# Patient Record
Sex: FEMALE | Race: WHITE | NOT HISPANIC OR LATINO | Employment: PART TIME | URBAN - METROPOLITAN AREA
[De-identification: names, ages, dates, MRNs, and addresses within clinical notes are randomized per-mention and may not be internally consistent; named-entity substitution may affect disease eponyms.]

---

## 2023-03-29 ENCOUNTER — OFFICE VISIT (OUTPATIENT)
Dept: PRIMARY CARE | Facility: CLINIC | Age: 43
End: 2023-03-29
Payer: COMMERCIAL

## 2023-03-29 VITALS
TEMPERATURE: 98.4 F | DIASTOLIC BLOOD PRESSURE: 82 MMHG | OXYGEN SATURATION: 93 % | SYSTOLIC BLOOD PRESSURE: 148 MMHG | HEART RATE: 117 BPM | WEIGHT: 206.4 LBS | BODY MASS INDEX: 37.75 KG/M2

## 2023-03-29 DIAGNOSIS — S16.1XXA NECK STRAIN, INITIAL ENCOUNTER: Primary | ICD-10-CM

## 2023-03-29 PROBLEM — N92.6 MENSES, IRREGULAR: Status: ACTIVE | Noted: 2023-03-29

## 2023-03-29 PROBLEM — E55.9 VITAMIN D INSUFFICIENCY: Status: ACTIVE | Noted: 2023-03-29

## 2023-03-29 PROBLEM — N92.6 MISSED PERIOD: Status: ACTIVE | Noted: 2023-03-29

## 2023-03-29 PROBLEM — J30.2 SEASONAL ALLERGIES: Status: ACTIVE | Noted: 2023-03-29

## 2023-03-29 PROCEDURE — 99212 OFFICE O/P EST SF 10 MIN: CPT | Performed by: NURSE PRACTITIONER

## 2023-03-29 PROCEDURE — 1036F TOBACCO NON-USER: CPT | Performed by: NURSE PRACTITIONER

## 2023-03-29 RX ORDER — ERGOCALCIFEROL 1.25 MG/1
1 CAPSULE ORAL
COMMUNITY
Start: 2022-08-25

## 2023-03-29 RX ORDER — NORETHINDRONE ACETATE AND ETHINYL ESTRADIOL 1MG-20(21)
1 KIT ORAL DAILY
COMMUNITY
Start: 2022-11-03 | End: 2023-11-09 | Stop reason: SINTOL

## 2023-03-29 ASSESSMENT — PAIN SCALES - GENERAL: PAINLEVEL: 4

## 2023-03-29 NOTE — PROGRESS NOTES
Subjective   Patient ID: Marisabel Stone is a 42 y.o. female who presents for Neck Pain (For the past 3 weeks).    HPI Pleasant established patient here with reports of neck pain, started 2 weeks ago when she had a cold with sinus congestion. Cold symptoms have resolved but continues with discomfort when she turns her head to the left. Denies injury/trauma or neck stiffness. Able to perform chin tuck, has FROM.     Review of Systems  HPI    Objective   /82 (BP Location: Left arm, Patient Position: Sitting, BP Cuff Size: Large adult)   Pulse (!) 117   Temp 36.9 °C (98.4 °F)   Wt 93.6 kg (206 lb 6.4 oz)   LMP 02/24/2023 (Exact Date)   SpO2 93%   BMI 37.75 kg/m²     Physical Exam  Constitutional:       Appearance: Normal appearance.   Neck:      Comments: Slight with turn to left  Cardiovascular:      Rate and Rhythm: Normal rate and regular rhythm.   Pulmonary:      Effort: Pulmonary effort is normal.      Breath sounds: Normal breath sounds.   Abdominal:      General: Bowel sounds are normal.      Palpations: Abdomen is soft.   Musculoskeletal:      Cervical back: Normal range of motion and neck supple. Tenderness present. No edema, signs of trauma, rigidity, torticollis or crepitus. Muscular tenderness present. Normal range of motion.   Neurological:      General: No focal deficit present.      Mental Status: She is alert and oriented to person, place, and time.   Psychiatric:         Mood and Affect: Mood normal.           Assessment/Plan   Problem List Items Addressed This Visit          Musculoskeletal    Neck strain, initial encounter - Primary     Stretches  Ibuprofen prn

## 2023-03-29 NOTE — PROGRESS NOTES
Subjective   Patient ID: Marisabel Stone is a 42 y.o. female who presents for Neck Pain (For the past 3 weeks).    HPI     Review of Systems    Objective   /82 (BP Location: Left arm, Patient Position: Sitting, BP Cuff Size: Large adult)   Pulse (!) 117   Temp 36.9 °C (98.4 °F)   Wt 93.6 kg (206 lb 6.4 oz)   LMP 02/24/2023 (Exact Date)   SpO2 93%   BMI 37.75 kg/m²     Physical Exam    Assessment/Plan

## 2023-08-25 ASSESSMENT — PROMIS GLOBAL HEALTH SCALE
RATE_GENERAL_HEALTH: GOOD
RATE_SOCIAL_SATISFACTION: VERY GOOD
RATE_QUALITY_OF_LIFE: VERY GOOD
RATE_AVERAGE_FATIGUE: MILD
CARRYOUT_SOCIAL_ACTIVITIES: VERY GOOD
RATE_PHYSICAL_HEALTH: GOOD
RATE_AVERAGE_PAIN: 3
CARRYOUT_PHYSICAL_ACTIVITIES: COMPLETELY
RATE_MENTAL_HEALTH: VERY GOOD
EMOTIONAL_PROBLEMS: RARELY

## 2023-08-29 ENCOUNTER — OFFICE VISIT (OUTPATIENT)
Dept: PRIMARY CARE | Facility: CLINIC | Age: 43
End: 2023-08-29
Payer: COMMERCIAL

## 2023-08-29 VITALS
BODY MASS INDEX: 37.87 KG/M2 | TEMPERATURE: 97.8 F | DIASTOLIC BLOOD PRESSURE: 80 MMHG | SYSTOLIC BLOOD PRESSURE: 122 MMHG | HEART RATE: 86 BPM | OXYGEN SATURATION: 98 % | HEIGHT: 61 IN | WEIGHT: 200.6 LBS

## 2023-08-29 DIAGNOSIS — Z12.31 BREAST CANCER SCREENING BY MAMMOGRAM: ICD-10-CM

## 2023-08-29 DIAGNOSIS — E55.9 VITAMIN D INSUFFICIENCY: ICD-10-CM

## 2023-08-29 DIAGNOSIS — Z00.00 ANNUAL PHYSICAL EXAM: Primary | ICD-10-CM

## 2023-08-29 DIAGNOSIS — J30.2 SEASONAL ALLERGIES: ICD-10-CM

## 2023-08-29 LAB
CALCIDIOL (25 OH VITAMIN D3) (NG/ML) IN SER/PLAS: 30 NG/ML
ERYTHROCYTE DISTRIBUTION WIDTH (RATIO) BY AUTOMATED COUNT: 13.2 % (ref 11.5–14.5)
ERYTHROCYTE MEAN CORPUSCULAR HEMOGLOBIN CONCENTRATION (G/DL) BY AUTOMATED: 31.8 G/DL (ref 32–36)
ERYTHROCYTE MEAN CORPUSCULAR VOLUME (FL) BY AUTOMATED COUNT: 91 FL (ref 80–100)
ERYTHROCYTES (10*6/UL) IN BLOOD BY AUTOMATED COUNT: 4.95 X10E12/L (ref 4–5.2)
HEMATOCRIT (%) IN BLOOD BY AUTOMATED COUNT: 44.9 % (ref 36–46)
HEMOGLOBIN (G/DL) IN BLOOD: 14.3 G/DL (ref 12–16)
LEUKOCYTES (10*3/UL) IN BLOOD BY AUTOMATED COUNT: 6.7 X10E9/L (ref 4.4–11.3)
NRBC (PER 100 WBCS) BY AUTOMATED COUNT: 0 /100 WBC (ref 0–0)
PLATELETS (10*3/UL) IN BLOOD AUTOMATED COUNT: 358 X10E9/L (ref 150–450)
THYROTROPIN (MIU/L) IN SER/PLAS BY DETECTION LIMIT <= 0.05 MIU/L: 1.97 MIU/L (ref 0.44–3.98)

## 2023-08-29 PROCEDURE — 83036 HEMOGLOBIN GLYCOSYLATED A1C: CPT

## 2023-08-29 PROCEDURE — 99396 PREV VISIT EST AGE 40-64: CPT | Performed by: NURSE PRACTITIONER

## 2023-08-29 PROCEDURE — 85027 COMPLETE CBC AUTOMATED: CPT

## 2023-08-29 PROCEDURE — 84443 ASSAY THYROID STIM HORMONE: CPT

## 2023-08-29 PROCEDURE — 1036F TOBACCO NON-USER: CPT | Performed by: NURSE PRACTITIONER

## 2023-08-29 PROCEDURE — 80061 LIPID PANEL: CPT

## 2023-08-29 PROCEDURE — 82306 VITAMIN D 25 HYDROXY: CPT

## 2023-08-29 PROCEDURE — 80053 COMPREHEN METABOLIC PANEL: CPT

## 2023-08-29 ASSESSMENT — PAIN SCALES - GENERAL: PAINLEVEL: 0-NO PAIN

## 2023-08-29 NOTE — PROGRESS NOTES
"Subjective   Patient ID: Marisabel Stone is a 42 y.o. female who presents for Annual Exam.    HPI  Pleasant established patient here for Annual exam.  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    HENT: Negative.     Respiratory: Negative.     Cardiovascular: Negative.    Gastrointestinal: Negative.    Genitourinary: Negative.    Musculoskeletal: Negative.    Psychiatric/Behavioral: Negative.     All other systems reviewed and are negative.    .vsVisit Vitals  /80 (BP Location: Left arm, Patient Position: Sitting, BP Cuff Size: Large adult)   Pulse 86   Temp 36.6 °C (97.8 °F)   Ht 1.537 m (5' 0.5\")   Wt 91 kg (200 lb 9.6 oz)   LMP 08/11/2023   SpO2 98%   BMI 38.53 kg/m²   OB Status Having periods   Smoking Status Never   BSA 1.97 m²         Objective   Physical Exam  Physical Exam  Vitals reviewed.   Constitutional:       General: She is active.   HENT:      Head: Normocephalic and atraumatic.   Eyes:      Extraocular Movements: Extraocular movements intact.      Pupils: Pupils are equal, round, and reactive to light.   Cardiovascular:      Rate and Rhythm: Normal rate and regular rhythm.   Pulmonary:      Effort: Pulmonary effort is normal.      Breath sounds: Normal breath sounds.   Abdominal:      General: Bowel sounds are normal.   Musculoskeletal:         General: Normal range of motion.      Cervical back: Neck supple.   Skin:     General: Skin is warm and dry.      Capillary Refill: Capillary refill takes less than 2 seconds.   Neurological:      General: No focal deficit " present.      Mental Status: She is alert.     Assessment/Plan   Problem List Items Addressed This Visit       Seasonal allergies     Well controlled  Zyrtec prn         Vitamin D insufficiency    Relevant Orders    Vitamin D, Total    Annual physical exam - Primary    Relevant Orders    CBC    Comprehensive Metabolic Panel    Vitamin D, Total    TSH with reflex to Free T4 if abnormal    Hemoglobin A1C    Lipid Panel    BI mammo bilateral screening tomosynthesis    Breast cancer screening by mammogram    Relevant Orders    BI mammo bilateral screening tomosynthesis

## 2023-08-29 NOTE — PROGRESS NOTES
Subjective   Patient ID: Marisabel Stone is a 42 y.o. female who presents for Annual Exam.    HPI     Review of Systems    Objective   LMP 08/11/2023     Physical Exam    Assessment/Plan

## 2023-08-30 LAB
ALANINE AMINOTRANSFERASE (SGPT) (U/L) IN SER/PLAS: 14 U/L (ref 7–45)
ALBUMIN (G/DL) IN SER/PLAS: 4.4 G/DL (ref 3.4–5)
ALKALINE PHOSPHATASE (U/L) IN SER/PLAS: 72 U/L (ref 33–110)
ANION GAP IN SER/PLAS: 15 MMOL/L (ref 10–20)
ASPARTATE AMINOTRANSFERASE (SGOT) (U/L) IN SER/PLAS: 26 U/L (ref 9–39)
BILIRUBIN TOTAL (MG/DL) IN SER/PLAS: 0.3 MG/DL (ref 0–1.2)
CALCIUM (MG/DL) IN SER/PLAS: 9.1 MG/DL (ref 8.6–10.6)
CARBON DIOXIDE, TOTAL (MMOL/L) IN SER/PLAS: 23 MMOL/L (ref 21–32)
CHLORIDE (MMOL/L) IN SER/PLAS: 106 MMOL/L (ref 98–107)
CHOLESTEROL (MG/DL) IN SER/PLAS: 199 MG/DL (ref 0–199)
CHOLESTEROL IN HDL (MG/DL) IN SER/PLAS: 40.1 MG/DL
CHOLESTEROL/HDL RATIO: 5
CREATININE (MG/DL) IN SER/PLAS: 0.67 MG/DL (ref 0.5–1.05)
ESTIMATED AVERAGE GLUCOSE FOR HBA1C: 108 MG/DL
GFR FEMALE: >90 ML/MIN/1.73M2
GLUCOSE (MG/DL) IN SER/PLAS: 96 MG/DL (ref 74–99)
HEMOGLOBIN A1C/HEMOGLOBIN TOTAL IN BLOOD: 5.4 %
LDL: 120 MG/DL (ref 0–99)
POTASSIUM (MMOL/L) IN SER/PLAS: 3.9 MMOL/L (ref 3.5–5.3)
PROTEIN TOTAL: 7.3 G/DL (ref 6.4–8.2)
SODIUM (MMOL/L) IN SER/PLAS: 140 MMOL/L (ref 136–145)
TRIGLYCERIDE (MG/DL) IN SER/PLAS: 194 MG/DL (ref 0–149)
UREA NITROGEN (MG/DL) IN SER/PLAS: 7 MG/DL (ref 6–23)
VLDL: 39 MG/DL (ref 0–40)

## 2023-11-08 PROBLEM — E66.9 CLASS 2 OBESITY WITH BODY MASS INDEX (BMI) OF 37.0 TO 37.9 IN ADULT: Status: ACTIVE | Noted: 2023-11-08

## 2023-11-08 PROBLEM — E66.812 CLASS 2 OBESITY WITH BODY MASS INDEX (BMI) OF 37.0 TO 37.9 IN ADULT: Status: ACTIVE | Noted: 2023-11-08

## 2023-11-09 ENCOUNTER — OFFICE VISIT (OUTPATIENT)
Dept: OBSTETRICS AND GYNECOLOGY | Facility: CLINIC | Age: 43
End: 2023-11-09
Payer: COMMERCIAL

## 2023-11-09 VITALS
BODY MASS INDEX: 37.31 KG/M2 | HEIGHT: 61 IN | WEIGHT: 197.6 LBS | SYSTOLIC BLOOD PRESSURE: 144 MMHG | DIASTOLIC BLOOD PRESSURE: 86 MMHG

## 2023-11-09 DIAGNOSIS — Z30.41 ENCOUNTER FOR SURVEILLANCE OF CONTRACEPTIVE PILLS: ICD-10-CM

## 2023-11-09 DIAGNOSIS — Z01.419 WELL WOMAN EXAM WITH ROUTINE GYNECOLOGICAL EXAM: Primary | ICD-10-CM

## 2023-11-09 PROCEDURE — 99396 PREV VISIT EST AGE 40-64: CPT

## 2023-11-09 PROCEDURE — 1036F TOBACCO NON-USER: CPT

## 2023-11-09 RX ORDER — NORETHINDRONE 0.35 MG/1
1 TABLET ORAL DAILY
Qty: 84 TABLET | Refills: 3 | Status: SHIPPED | OUTPATIENT
Start: 2023-11-09 | End: 2024-11-08

## 2023-11-09 NOTE — PROGRESS NOTES
"Assessment/Plan     42 y.o. presents for well woman exam.     Cervical Cancer Screening  - PAP up to date  - Reviewed ASCCP guidelines with pt; next PAP 11/2027    STD Screening  - declines    Contraceptive Plan  - Pt currently taking Junel OCP, pt happy with this method of contraception  - Reviewed with patient she has risk factors including elevated BP (144/82 today) as well as obesity, which increase the risk of blood clots while taking birth control containing estrogen. Recommended switching to POP, to which she is agreeable.  - POP sent to pt preferred pharmacy. Instructions provided, all questions answered.    Health Maintenance  - SBE reviewed and encouraged monthly  - Mammo q1-2 years if 40+; order already in EMR, pt instructed on how to schedule   - diet and exercise reviewed; recommended 150min exercise per week or 30min/day x5 days  - Routine follow up with PCP for health maintenance examination encouraged    F/U in 1 year or as needed.    FABRICIO Jeter-MADISON     Subjective     Marisabel Stone is a 42 y.o. female presenting for a well woman exam. She states her periods have been getting lighter over the past year since she has been on OCP's.     PMH, PSH, allergies, and current medications reviewed - see chart.  Family Hx reviewed. Pt reports no family hx of gynecologic or breast cancer in first degree family members.   OB Hx: G0    Social Hx:  - relationship: single  - sexually active: yes  - employment: works for [employer]  - diet:  general  - exercise: walks daily  - denies tobacco or illicit drug use. reports occasional alcohol use.     Sexual Concerns: denies   PAP Hx: Last PAP 11/3/22 nml, HPV neg  Last mammo: 1/5/23 Cat 1 neg  STI Hx: denies  Contraception: Junel OCP  Menstrual Periods: LMP 11/5/23. Periods are regular every 28-30 days, lasting  3-5  days.    Objective     /86   Ht 1.549 m (5' 1\")   Wt 89.6 kg (197 lb 9.6 oz)   LMP 11/05/2023 (Exact Date)   BMI 37.34 kg/m² "     Physical Exam  Vitals reviewed.   Constitutional:       General: She is not in acute distress.     Appearance: Normal appearance.   HENT:      Head: Normocephalic and atraumatic.      Mouth/Throat:      Palate: No mass.   Neck:      Thyroid: No thyroid mass, thyromegaly or thyroid tenderness.   Cardiovascular:      Rate and Rhythm: Normal rate and regular rhythm.      Heart sounds: Normal heart sounds, S1 normal and S2 normal.   Pulmonary:      Effort: Pulmonary effort is normal.      Breath sounds: Normal breath sounds.   Chest:   Breasts:     Right: Normal. No mass, nipple discharge, skin change or tenderness.      Left: Normal. No mass, nipple discharge, skin change or tenderness.   Abdominal:      General: Abdomen is flat.      Palpations: Abdomen is soft.      Tenderness: There is no abdominal tenderness.   Genitourinary:     General: Normal vulva.      Exam position: Lithotomy position.      Pubic Area: No rash.       Labia:         Right: No rash or lesion.         Left: No rash or lesion.       Urethra: No prolapse, urethral swelling or urethral lesion.      Vagina: Normal.      Cervix: No cervical motion tenderness, discharge or lesion.      Uterus: Not enlarged and not tender.       Adnexa:         Right: No mass or tenderness.          Left: No mass or tenderness.     Musculoskeletal:         General: Normal range of motion.      Cervical back: Normal range of motion and neck supple.   Skin:     General: Skin is warm and dry.   Neurological:      Mental Status: She is alert and oriented to person, place, and time.   Psychiatric:         Mood and Affect: Mood normal.         Behavior: Behavior normal.         Thought Content: Thought content normal.         Judgment: Judgment normal.

## 2024-01-11 ENCOUNTER — ANCILLARY PROCEDURE (OUTPATIENT)
Dept: RADIOLOGY | Facility: CLINIC | Age: 44
End: 2024-01-11
Payer: COMMERCIAL

## 2024-01-11 DIAGNOSIS — Z00.00 ANNUAL PHYSICAL EXAM: ICD-10-CM

## 2024-01-11 DIAGNOSIS — Z12.31 BREAST CANCER SCREENING BY MAMMOGRAM: ICD-10-CM

## 2024-01-11 PROCEDURE — 77063 BREAST TOMOSYNTHESIS BI: CPT | Mod: BILATERAL PROCEDURE | Performed by: RADIOLOGY

## 2024-01-11 PROCEDURE — 77067 SCR MAMMO BI INCL CAD: CPT

## 2024-01-11 PROCEDURE — 77067 SCR MAMMO BI INCL CAD: CPT | Mod: BILATERAL PROCEDURE | Performed by: RADIOLOGY

## 2024-09-01 ASSESSMENT — PROMIS GLOBAL HEALTH SCALE
RATE_PHYSICAL_HEALTH: GOOD
RATE_MENTAL_HEALTH: VERY GOOD
RATE_AVERAGE_PAIN: 4
RATE_AVERAGE_FATIGUE: MILD
CARRYOUT_SOCIAL_ACTIVITIES: EXCELLENT
RATE_SOCIAL_SATISFACTION: VERY GOOD
RATE_QUALITY_OF_LIFE: VERY GOOD
CARRYOUT_PHYSICAL_ACTIVITIES: COMPLETELY
RATE_GENERAL_HEALTH: GOOD
EMOTIONAL_PROBLEMS: SOMETIMES

## 2024-09-03 ENCOUNTER — APPOINTMENT (OUTPATIENT)
Dept: PRIMARY CARE | Facility: CLINIC | Age: 44
End: 2024-09-03
Payer: COMMERCIAL

## 2024-09-03 VITALS
WEIGHT: 197.6 LBS | OXYGEN SATURATION: 100 % | SYSTOLIC BLOOD PRESSURE: 124 MMHG | TEMPERATURE: 96.8 F | BODY MASS INDEX: 37.31 KG/M2 | HEART RATE: 77 BPM | DIASTOLIC BLOOD PRESSURE: 82 MMHG | HEIGHT: 61 IN

## 2024-09-03 DIAGNOSIS — Z12.31 BREAST CANCER SCREENING BY MAMMOGRAM: ICD-10-CM

## 2024-09-03 DIAGNOSIS — Z00.00 ANNUAL PHYSICAL EXAM: Primary | ICD-10-CM

## 2024-09-03 DIAGNOSIS — J30.2 SEASONAL ALLERGIES: ICD-10-CM

## 2024-09-03 DIAGNOSIS — E55.9 VITAMIN D INSUFFICIENCY: ICD-10-CM

## 2024-09-03 DIAGNOSIS — E66.09 CLASS 2 OBESITY DUE TO EXCESS CALORIES WITHOUT SERIOUS COMORBIDITY WITH BODY MASS INDEX (BMI) OF 37.0 TO 37.9 IN ADULT: ICD-10-CM

## 2024-09-03 LAB
25(OH)D3 SERPL-MCNC: 29 NG/ML (ref 30–100)
ALBUMIN SERPL BCP-MCNC: 4.4 G/DL (ref 3.4–5)
ALP SERPL-CCNC: 82 U/L (ref 33–110)
ALT SERPL W P-5'-P-CCNC: 14 U/L (ref 7–45)
ANION GAP SERPL CALC-SCNC: 15 MMOL/L (ref 10–20)
AST SERPL W P-5'-P-CCNC: 28 U/L (ref 9–39)
BILIRUB SERPL-MCNC: 0.5 MG/DL (ref 0–1.2)
BUN SERPL-MCNC: 9 MG/DL (ref 6–23)
CALCIUM SERPL-MCNC: 9.2 MG/DL (ref 8.6–10.6)
CHLORIDE SERPL-SCNC: 105 MMOL/L (ref 98–107)
CHOLEST SERPL-MCNC: 205 MG/DL (ref 0–199)
CHOLESTEROL/HDL RATIO: 4.8
CO2 SERPL-SCNC: 23 MMOL/L (ref 21–32)
CREAT SERPL-MCNC: 0.65 MG/DL (ref 0.5–1.05)
EGFRCR SERPLBLD CKD-EPI 2021: >90 ML/MIN/1.73M*2
ERYTHROCYTE [DISTWIDTH] IN BLOOD BY AUTOMATED COUNT: 13.3 % (ref 11.5–14.5)
EST. AVERAGE GLUCOSE BLD GHB EST-MCNC: 114 MG/DL
GLUCOSE SERPL-MCNC: 99 MG/DL (ref 74–99)
HBA1C MFR BLD: 5.6 %
HCT VFR BLD AUTO: 51.6 % (ref 36–46)
HDLC SERPL-MCNC: 42.8 MG/DL
HGB BLD-MCNC: 15.9 G/DL (ref 12–16)
LDLC SERPL CALC-MCNC: 128 MG/DL
MCH RBC QN AUTO: 28.6 PG (ref 26–34)
MCHC RBC AUTO-ENTMCNC: 30.8 G/DL (ref 32–36)
MCV RBC AUTO: 93 FL (ref 80–100)
NON HDL CHOLESTEROL: 162 MG/DL (ref 0–149)
NRBC BLD-RTO: 0 /100 WBCS (ref 0–0)
PLATELET # BLD AUTO: 308 X10*3/UL (ref 150–450)
POTASSIUM SERPL-SCNC: 3.9 MMOL/L (ref 3.5–5.3)
PROT SERPL-MCNC: 7.4 G/DL (ref 6.4–8.2)
RBC # BLD AUTO: 5.56 X10*6/UL (ref 4–5.2)
SODIUM SERPL-SCNC: 139 MMOL/L (ref 136–145)
TRIGL SERPL-MCNC: 171 MG/DL (ref 0–149)
TSH SERPL-ACNC: 2.22 MIU/L (ref 0.44–3.98)
VLDL: 34 MG/DL (ref 0–40)
WBC # BLD AUTO: 7.4 X10*3/UL (ref 4.4–11.3)

## 2024-09-03 PROCEDURE — 84443 ASSAY THYROID STIM HORMONE: CPT

## 2024-09-03 PROCEDURE — 80053 COMPREHEN METABOLIC PANEL: CPT

## 2024-09-03 PROCEDURE — 3008F BODY MASS INDEX DOCD: CPT | Performed by: NURSE PRACTITIONER

## 2024-09-03 PROCEDURE — 85027 COMPLETE CBC AUTOMATED: CPT

## 2024-09-03 PROCEDURE — 99396 PREV VISIT EST AGE 40-64: CPT | Performed by: NURSE PRACTITIONER

## 2024-09-03 PROCEDURE — 83036 HEMOGLOBIN GLYCOSYLATED A1C: CPT

## 2024-09-03 PROCEDURE — 1036F TOBACCO NON-USER: CPT | Performed by: NURSE PRACTITIONER

## 2024-09-03 PROCEDURE — 82306 VITAMIN D 25 HYDROXY: CPT

## 2024-09-03 PROCEDURE — 80061 LIPID PANEL: CPT

## 2024-09-03 ASSESSMENT — PATIENT HEALTH QUESTIONNAIRE - PHQ9
2. FEELING DOWN, DEPRESSED OR HOPELESS: NOT AT ALL
1. LITTLE INTEREST OR PLEASURE IN DOING THINGS: NOT AT ALL
SUM OF ALL RESPONSES TO PHQ9 QUESTIONS 1 AND 2: 0

## 2024-09-03 ASSESSMENT — PAIN SCALES - GENERAL: PAINLEVEL: 0-NO PAIN

## 2024-09-03 ASSESSMENT — ENCOUNTER SYMPTOMS: DEPRESSION: 0

## 2024-09-03 NOTE — PROGRESS NOTES
"Subjective   Patient ID: Marisabel Stone is a 43 y.o. female who presents for Annual Exam (Pt is fasting ).    HPI  Pleasant established 44 y/o female with PMH allergies, Abnormal menses, presents for Annual Exam  Current concerns  Reports muscles stiff in am, does not stretch, recently at [place], slept in cabin    Allergies well controlled  Defers flu, gets vaccine at work    Single  Works as   No kids     Diet healthy, tries to eat a good balance  Caffeine 1-2  Water 48oz+  Exercise walks, bikes    Non-smoker  Alcohol Social       Eye exam 2020  Dental exam 2023, scheduled  Gyn no abnormal pap 2023  scheduled  Mammogram, Jan 2023, referral       Review of Systems  Review of Systems   Constitutional: Negative.    HENT: Negative.     Respiratory: Negative.     Cardiovascular: Negative.    Gastrointestinal: Negative.    Genitourinary: Negative.    Musculoskeletal: Negative.    Psychiatric/Behavioral: Negative.     All other systems reviewed and are negative.    .vsVisit Vitals  /82 (BP Location: Left arm, Patient Position: Sitting)   Pulse 77   Temp 36 °C (96.8 °F)   Ht 1.549 m (5' 1\")   Wt 89.6 kg (197 lb 9.6 oz)   LMP 09/03/2024   SpO2 100%   BMI 37.34 kg/m²   OB Status Having periods   Smoking Status Never   BSA 1.96 m²         Objective   Physical Exam  Vitals reviewed.   Constitutional:       Appearance: Normal appearance.   HENT:      Head: Normocephalic and atraumatic.      Right Ear: Tympanic membrane and ear canal normal.      Left Ear: Tympanic membrane and ear canal normal.      Nose: Nose normal.      Mouth/Throat:      Pharynx: Oropharynx is clear.   Eyes:      Extraocular Movements: Extraocular movements intact.      Conjunctiva/sclera: Conjunctivae normal.      Pupils: Pupils are equal, round, and reactive to light.   Cardiovascular:      Rate and Rhythm: Normal rate and regular rhythm.      Pulses: Normal pulses.      Heart sounds: Normal heart sounds.   Pulmonary:      " Effort: Pulmonary effort is normal.      Breath sounds: Normal breath sounds. No wheezing.   Abdominal:      General: Bowel sounds are normal. There is no distension.      Palpations: Abdomen is soft.      Tenderness: There is no abdominal tenderness.      Comments: hypo   Musculoskeletal:         General: Normal range of motion.      Cervical back: Normal range of motion and neck supple.   Skin:     General: Skin is warm.      Capillary Refill: Capillary refill takes 2 to 3 seconds.   Neurological:      General: No focal deficit present.      Mental Status: She is alert.   Psychiatric:         Mood and Affect: Mood normal.           Assessment/Plan   Problem List Items Addressed This Visit       Seasonal allergies    Vitamin D insufficiency    Relevant Orders    Vitamin D 25-Hydroxy,Total (for eval of Vitamin D levels)    Annual physical exam - Primary    Relevant Orders    CBC    Comprehensive Metabolic Panel    Vitamin D 25-Hydroxy,Total (for eval of Vitamin D levels)    TSH with reflex to Free T4 if abnormal    Hemoglobin A1C    Lipid Panel    BI mammo bilateral screening tomosynthesis    Breast cancer screening by mammogram    Relevant Orders    BI mammo bilateral screening tomosynthesis    Class 2 obesity with body mass index (BMI) of 37.0 to 37.9 in adult

## 2024-09-03 NOTE — PATIENT INSTRUCTIONS
You are due for an Eye exam    Start daily stretches, walking is a great exercise!!  Aim for no less than 15 minutes per day, With a goal of 3 miles a day!    Health Maintenance  Continue to follow a healthy diet with fruits and vegetables at most meals.  Daily exercise is recommended as well as adding weights 3 times a week to maintain muscle mass and for bone health.  It is recommended you drink 6 to 8 glasses of water daily, more when you are exerting yourself or in hot weather.  Make sure you follow the health screening guidelines and keep up to date on your immunizations!

## 2024-09-16 ENCOUNTER — PATIENT MESSAGE (OUTPATIENT)
Dept: OBSTETRICS AND GYNECOLOGY | Facility: CLINIC | Age: 44
End: 2024-09-16
Payer: COMMERCIAL

## 2024-09-16 DIAGNOSIS — Z30.41 ENCOUNTER FOR SURVEILLANCE OF CONTRACEPTIVE PILLS: ICD-10-CM

## 2024-09-16 RX ORDER — NORETHINDRONE 0.35 MG/1
1 TABLET ORAL DAILY
Qty: 28 TABLET | Refills: 2 | Status: SHIPPED | OUTPATIENT
Start: 2024-09-16 | End: 2025-09-16

## 2024-10-22 ENCOUNTER — OFFICE VISIT (OUTPATIENT)
Dept: URGENT CARE | Age: 44
End: 2024-10-22
Payer: COMMERCIAL

## 2024-10-22 VITALS
WEIGHT: 199 LBS | OXYGEN SATURATION: 98 % | HEIGHT: 62 IN | DIASTOLIC BLOOD PRESSURE: 87 MMHG | RESPIRATION RATE: 18 BRPM | HEART RATE: 110 BPM | BODY MASS INDEX: 36.62 KG/M2 | SYSTOLIC BLOOD PRESSURE: 152 MMHG | TEMPERATURE: 98.6 F

## 2024-10-22 DIAGNOSIS — B02.9 HERPES ZOSTER WITHOUT COMPLICATION: ICD-10-CM

## 2024-10-22 DIAGNOSIS — L50.9 URTICARIA: ICD-10-CM

## 2024-10-22 DIAGNOSIS — R21 RASH: Primary | ICD-10-CM

## 2024-10-22 PROCEDURE — 99203 OFFICE O/P NEW LOW 30 MIN: CPT | Performed by: NURSE PRACTITIONER

## 2024-10-22 PROCEDURE — 3008F BODY MASS INDEX DOCD: CPT | Performed by: NURSE PRACTITIONER

## 2024-10-22 PROCEDURE — 1036F TOBACCO NON-USER: CPT | Performed by: NURSE PRACTITIONER

## 2024-10-22 RX ORDER — VALACYCLOVIR HYDROCHLORIDE 1 G/1
1000 TABLET, FILM COATED ORAL 3 TIMES DAILY
Qty: 21 TABLET | Refills: 0 | Status: SHIPPED | OUTPATIENT
Start: 2024-10-22 | End: 2024-10-29

## 2024-10-22 RX ORDER — HYDROXYZINE HYDROCHLORIDE 10 MG/1
10 TABLET, FILM COATED ORAL EVERY 8 HOURS PRN
Qty: 15 TABLET | Refills: 0 | Status: SHIPPED | OUTPATIENT
Start: 2024-10-22 | End: 2024-10-27

## 2024-10-22 ASSESSMENT — ENCOUNTER SYMPTOMS
RESPIRATORY NEGATIVE: 1
GASTROINTESTINAL NEGATIVE: 1
ENDOCRINE NEGATIVE: 1
HEMATOLOGIC/LYMPHATIC NEGATIVE: 1
CARDIOVASCULAR NEGATIVE: 1
EYES NEGATIVE: 1
ALLERGIC/IMMUNOLOGIC NEGATIVE: 1
PSYCHIATRIC NEGATIVE: 1
NEUROLOGICAL NEGATIVE: 1
MUSCULOSKELETAL NEGATIVE: 1
CONSTITUTIONAL NEGATIVE: 1

## 2024-10-22 NOTE — PROGRESS NOTES
"Subjective   Patient ID: Marisabel Stone is a 43 y.o. female. They present today with a chief complaint of Rash (On right size of back and right arm x 3 days).    History of Present Illness    Rash      Past Medical History  Allergies as of 10/22/2024 - Reviewed 10/22/2024   Allergen Reaction Noted    Bee pollen Unknown 03/29/2023    Cat dander Unknown 03/29/2023    Dog dander Unknown 03/29/2023       (Not in a hospital admission)       No past medical history on file.    Past Surgical History:   Procedure Laterality Date    OTHER SURGICAL HISTORY  07/12/2018    Oral Surgery        reports that she has never smoked. She has never used smokeless tobacco. She reports current alcohol use. She reports that she does not use drugs.    Review of Systems  Review of Systems   Constitutional: Negative.    HENT: Negative.     Eyes: Negative.    Respiratory: Negative.     Cardiovascular: Negative.    Gastrointestinal: Negative.    Endocrine: Negative.    Genitourinary: Negative.    Musculoskeletal: Negative.    Skin:  Positive for rash.   Allergic/Immunologic: Negative.    Neurological: Negative.    Hematological: Negative.    Psychiatric/Behavioral: Negative.                                    Objective    Vitals:    10/22/24 1328   BP: 152/87   BP Location: Left arm   Pulse: 110   Resp: 18   Temp: 37 °C (98.6 °F)   SpO2: 98%   Weight: 90.3 kg (199 lb)   Height: 1.568 m (5' 1.75\")     No LMP recorded.    Physical Exam  Constitutional:       Appearance: Normal appearance.   HENT:      Head: Normocephalic.      Nose: Nose normal.      Mouth/Throat:      Mouth: Mucous membranes are dry.      Pharynx: Oropharynx is clear.   Eyes:      Extraocular Movements: Extraocular movements intact.      Pupils: Pupils are equal, round, and reactive to light.   Cardiovascular:      Rate and Rhythm: Normal rate and regular rhythm.      Pulses: Normal pulses.      Heart sounds: Normal heart sounds.   Pulmonary:      Effort: Pulmonary effort is " normal.      Breath sounds: Normal breath sounds.   Musculoskeletal:         General: Normal range of motion.      Cervical back: Normal range of motion and neck supple.   Skin:     General: Skin is warm and dry.      Findings: Rash present. Rash is pustular and vesicular.      Comments: Intact vesicular-pustular rash to right upper back and right quadrant of right breast   Neurological:      General: No focal deficit present.      Mental Status: She is alert and oriented to person, place, and time.   Psychiatric:         Mood and Affect: Mood normal.         Behavior: Behavior normal.       Procedures    Point of Care Test & Imaging Results from this visit  No results found for this visit on 10/22/24.   No results found.    Diagnostic study results (if any) were reviewed by ASHLEY Jeff.    Assessment/Plan   Allergies, medications, history, and pertinent labs/EKGs/Imaging reviewed by ASHLEY Jeff.     Medical Decision Making  Per PE, abnormalities were evaluated - Intact vesicular-pustular rash to right upper back and right quadrant of right breast which is consistent with shingles.  Per PE, rash is consistent with shingles so treatment initiated within 72 hours with valtrex and education provided to pt to avoid children whom have not had the chicken pox rash, immunocompromised people and pregnant women.  F/u with PCP 1 week  If s/s worsen, go to nearest ER    Starting treatment within 72 hours of developing a rash can:  Reduce your symptoms, such as burning and stinging  Lessen the amount of time you have shingles  Lower your risk of developing other health problems, such as long-lasting pain that can linger for months or years after the rash clears  Care for the rash every day until it clears. Most people see the blister-like rash start to scab in about 7 to 10 days. It usually takes between 2 and 4 weeks for the rash to go away completely.    Until the rash clears completely, you want  to do the following every day:    Wash the rash with a fragrance-free cleanser  Apply a thin layer of clean pure petroleum jelly  Cover the rash with a new, sterile, non-stick bandage  Wash your hands after touching the rash  Treat uncomfortable skin safely and effectively. While your skin heals, the skin with the rash can feel extremely uncomfortable, and even painful. To ease this discomfort, dermatologists recommend the following:    Keep uncomfortable skin cool by applying a clean, cool, and damp washcloth several times a day. You want to apply it for 5 to 10 minutes each time.  Soak in a cool, oatmeal bath.  After the blisters have scabbed over, calm itchy skin by applying calamine lotion.  Take good care of yourself while you have the shingles rash. The following can help you heal and feel better:    Get plenty of rest  Eat healthy, well-balanced meals  Take your mind off your discomfort by doing activities that you enjoy, such as listening to music, watching TV, or reading  Avoid stress  Wear loose-fitting clothing made of cotton or linen  Tell your doctor if you experience any other health problems. Some people who get shingles develop other health problems. Call your doctor’s office right away if you have any of the following:    The rash shows signs of infection, such as swelling, pus, or not clearing  Pain continues after the rash clears  You feel unwell after the rash clears  Talk with your doctor about getting the shingles vaccine. You can get shingles again. A shingles vaccine that the U.S. Food and Drug Administration (FDA) approved in 2017 can greatly reduce your risk of developing shingles again.    Most people who are otherwise healthy can get this vaccine after the rash clears completely.    Your doctor can tell you if this vaccine is right for you. If it is, your doctor can tell you when you can have it.    You can learn more about this vaccine at, Shingles vaccine (CDC).    Prevent others from  getting sick. Until the shingles rash clears, you are contagious.    Anyone who has not had chickenpox (or the vaccine for chickenpox) can catch the virus. This could cause chickenpox. To avoid infecting others, who could get chickenpox and later shingles, dermatologists recommend that you do the following until the rash clears:    Cover the rash  Avoid touching and scratching your rash  Stay away from pregnant women, babies 12 months old or younger, anyone who is sick, and everyone who has not had chickenpox      Orders and Diagnoses  Diagnoses and all orders for this visit:  Rash  Herpes zoster without complication  -     valACYclovir (Valtrex) 1 gram tablet; Take 1 tablet (1,000 mg) by mouth 3 times a day for 7 days.  Urticaria  -     hydrOXYzine HCL (Atarax) 10 mg tablet; Take 1 tablet (10 mg) by mouth every 8 hours if needed for itching for up to 5 days.      Medical Admin Record      Patient disposition: Home    Electronically signed by ASHLEY Jeff  1:39 PM

## 2024-11-14 ENCOUNTER — APPOINTMENT (OUTPATIENT)
Dept: OBSTETRICS AND GYNECOLOGY | Facility: CLINIC | Age: 44
End: 2024-11-14
Payer: COMMERCIAL

## 2024-11-19 ENCOUNTER — APPOINTMENT (OUTPATIENT)
Dept: OBSTETRICS AND GYNECOLOGY | Facility: CLINIC | Age: 44
End: 2024-11-19
Payer: COMMERCIAL

## 2024-11-20 ENCOUNTER — APPOINTMENT (OUTPATIENT)
Dept: OBSTETRICS AND GYNECOLOGY | Facility: CLINIC | Age: 44
End: 2024-11-20
Payer: COMMERCIAL

## 2024-11-20 VITALS
WEIGHT: 196.6 LBS | HEIGHT: 62 IN | SYSTOLIC BLOOD PRESSURE: 126 MMHG | DIASTOLIC BLOOD PRESSURE: 86 MMHG | BODY MASS INDEX: 36.18 KG/M2

## 2024-11-20 DIAGNOSIS — Z30.41 ENCOUNTER FOR SURVEILLANCE OF CONTRACEPTIVE PILLS: ICD-10-CM

## 2024-11-20 DIAGNOSIS — Z01.419 WELL WOMAN EXAM WITH ROUTINE GYNECOLOGICAL EXAM: Primary | ICD-10-CM

## 2024-11-20 PROBLEM — N92.6 MISSED PERIOD: Status: RESOLVED | Noted: 2023-03-29 | Resolved: 2024-11-20

## 2024-11-20 PROCEDURE — 3008F BODY MASS INDEX DOCD: CPT | Performed by: OBSTETRICS & GYNECOLOGY

## 2024-11-20 PROCEDURE — 99396 PREV VISIT EST AGE 40-64: CPT | Performed by: OBSTETRICS & GYNECOLOGY

## 2024-11-20 RX ORDER — NORETHINDRONE 0.35 MG/1
1 TABLET ORAL DAILY
Qty: 28 TABLET | Refills: 11 | Status: SHIPPED | OUTPATIENT
Start: 2024-11-20 | End: 2025-11-20

## 2024-11-20 NOTE — PROGRESS NOTES
Subjective   Patient ID: Marisabel Stone is a 43 y.o. female who presents for Annual Exam. Menses light , 5 days, monthly.    Last pap: 11/3/22 normal HPV-  Last mamm: mammogram already ordered in chart and already scheduled  LMP: 11/13/2024  Contraception: OCP's  Sexually active: yes  Self breast exam: yes  Diet: balanced  Exercise: yes      HPI:  Here for annual well exam . She is doing well.    ROS:  Negative     Physical Exam      Vitals:    11/20/24 1359   BP: 126/86         Appearance: Normal appearance. Affect normal and alert  Pulmonary:      Effort: Pulmonary effort is normal. Breath sounds clear  Skin: no rashes or lesions     Breasts:     Breasts bilaterally are symmetrical. No masses or axillary adenopathy. No skin or nipple changes    Abdominal:     Abdomen is flat, soft, nontender. No distension. No mass palpated.      Genitourinary:     Labia: no skin lesions or rash       Urethra: No lesions.      Bladder with no prolapse     Vagina: No discharge, mucosa is pink with no lesions.     Cervix:    mobile and nontender no discharge     Uterus:   Not enlarged, small, nontender.      Adnexa: Bilaterally with  No mass or tenderness.            Extremities:  Nontender, no edema. Normal range of motion        Assessment/ Plan:   1. Encounter for surveillance of contraceptive pills   refill  - norethindrone (Micronor) 0.35 mg tablet; Take 1 tablet (0.35 mg) by mouth once daily.  Dispense: 28 tablet; Refill: 11    2. Well woman exam with routine gynecological exam (Primary)     Mammo ordered by PCP    Follow up 1 year or prn     Saray Hernandez, [facility] 11/20/24 1:53 PM

## 2025-01-16 ENCOUNTER — HOSPITAL ENCOUNTER (OUTPATIENT)
Dept: RADIOLOGY | Facility: CLINIC | Age: 45
Discharge: HOME | End: 2025-01-16
Payer: COMMERCIAL

## 2025-01-16 VITALS — WEIGHT: 196 LBS | BODY MASS INDEX: 36.07 KG/M2 | HEIGHT: 62 IN

## 2025-01-16 DIAGNOSIS — Z00.00 ANNUAL PHYSICAL EXAM: ICD-10-CM

## 2025-01-16 DIAGNOSIS — Z12.31 BREAST CANCER SCREENING BY MAMMOGRAM: ICD-10-CM

## 2025-01-16 PROCEDURE — 77067 SCR MAMMO BI INCL CAD: CPT | Performed by: RADIOLOGY

## 2025-01-16 PROCEDURE — 77067 SCR MAMMO BI INCL CAD: CPT

## 2025-01-16 PROCEDURE — 77063 BREAST TOMOSYNTHESIS BI: CPT | Performed by: RADIOLOGY

## 2025-01-17 DIAGNOSIS — R92.8 ABNORMAL MAMMOGRAM OF BOTH BREASTS: Primary | ICD-10-CM

## 2025-01-22 ENCOUNTER — HOSPITAL ENCOUNTER (OUTPATIENT)
Dept: RADIOLOGY | Facility: CLINIC | Age: 45
Discharge: HOME | End: 2025-01-22
Payer: COMMERCIAL

## 2025-01-22 DIAGNOSIS — R92.8 ABNORMAL MAMMOGRAM OF BOTH BREASTS: ICD-10-CM

## 2025-01-22 PROCEDURE — 76982 USE 1ST TARGET LESION: CPT | Mod: RT

## 2025-01-22 PROCEDURE — 76642 ULTRASOUND BREAST LIMITED: CPT | Mod: RT

## 2025-01-22 PROCEDURE — 77066 DX MAMMO INCL CAD BI: CPT | Performed by: STUDENT IN AN ORGANIZED HEALTH CARE EDUCATION/TRAINING PROGRAM

## 2025-01-22 PROCEDURE — 77066 DX MAMMO INCL CAD BI: CPT

## 2025-01-22 PROCEDURE — 76642 ULTRASOUND BREAST LIMITED: CPT | Performed by: STUDENT IN AN ORGANIZED HEALTH CARE EDUCATION/TRAINING PROGRAM

## 2025-01-22 PROCEDURE — 77062 BREAST TOMOSYNTHESIS BI: CPT | Performed by: STUDENT IN AN ORGANIZED HEALTH CARE EDUCATION/TRAINING PROGRAM

## 2025-09-03 ASSESSMENT — PROMIS GLOBAL HEALTH SCALE
RATE_PHYSICAL_HEALTH: GOOD
RATE_AVERAGE_PAIN: 1
RATE_AVERAGE_FATIGUE: MODERATE
RATE_MENTAL_HEALTH: GOOD
CARRYOUT_PHYSICAL_ACTIVITIES: MOSTLY
RATE_SOCIAL_SATISFACTION: GOOD
CARRYOUT_SOCIAL_ACTIVITIES: GOOD
EMOTIONAL_PROBLEMS: NEVER
RATE_GENERAL_HEALTH: GOOD
RATE_QUALITY_OF_LIFE: VERY GOOD

## 2025-09-05 ENCOUNTER — APPOINTMENT (OUTPATIENT)
Dept: PRIMARY CARE | Facility: CLINIC | Age: 45
End: 2025-09-05
Payer: COMMERCIAL

## 2025-09-05 ASSESSMENT — ENCOUNTER SYMPTOMS
LOSS OF SENSATION IN FEET: 0
DEPRESSION: 0
OCCASIONAL FEELINGS OF UNSTEADINESS: 0

## 2025-09-05 ASSESSMENT — PATIENT HEALTH QUESTIONNAIRE - PHQ9
1. LITTLE INTEREST OR PLEASURE IN DOING THINGS: NOT AT ALL
2. FEELING DOWN, DEPRESSED OR HOPELESS: NOT AT ALL
SUM OF ALL RESPONSES TO PHQ9 QUESTIONS 1 AND 2: 0

## 2025-11-24 ENCOUNTER — APPOINTMENT (OUTPATIENT)
Facility: CLINIC | Age: 45
End: 2025-11-24
Payer: COMMERCIAL

## 2026-09-09 ENCOUNTER — APPOINTMENT (OUTPATIENT)
Dept: PRIMARY CARE | Facility: CLINIC | Age: 46
End: 2026-09-09
Payer: COMMERCIAL